# Patient Record
Sex: MALE | Race: WHITE | URBAN - METROPOLITAN AREA
[De-identification: names, ages, dates, MRNs, and addresses within clinical notes are randomized per-mention and may not be internally consistent; named-entity substitution may affect disease eponyms.]

---

## 2023-03-28 NOTE — PATIENT INSTRUCTIONS
- You were seen in clinic for regular annual check-up. We have ordered labs for you and we will call you with the results. Please obtain the bloodwork fasting for 12 hours. OK to drink water the day of your blood draw  -We did focus on painful urination with foamy urine. We will obtain blood test to monitor your kidney function and protein levels. We should also check urine tests to rule out infection or any other inflammatory cause though there is a low suspicion for any secondary underlying cause. In most cases, protein in the urine can cause foamy urine. You do have a high protein intake which is good. Lets see if we can try to incorporate more fruits, vegetables. Try to increase your water intake throughout the daytime as relative dehydration can also cause more concentrated and foamy urine.    -Continue with Xyzal, Zyrtec on an as-needed basis. -Vaccines may be due for: Flu shot, COVID dose #4, tetanus/Tdap  - Please continue to eat a varied diet including recommended servings of vegetables, fruits, and low fat dairy. Minimize high saturated fats (such as fast foods) and high sugar intake (such as soda)  - We recommend 150 minutes of moderate intensity exercise (brisk walking, swimming) weekly to maintain your current weight. Targeted weight loss will require more vigorous exercise or more than 150 minutes/week.       Return to clinic in 1 year for annual physical examination

## 2023-03-30 ENCOUNTER — OFFICE VISIT (OUTPATIENT)
Dept: INTERNAL MEDICINE CLINIC | Facility: CLINIC | Age: 28
End: 2023-03-30

## 2023-03-30 VITALS
OXYGEN SATURATION: 98 % | DIASTOLIC BLOOD PRESSURE: 88 MMHG | HEIGHT: 72 IN | BODY MASS INDEX: 26.41 KG/M2 | SYSTOLIC BLOOD PRESSURE: 134 MMHG | TEMPERATURE: 98 F | WEIGHT: 195 LBS | RESPIRATION RATE: 16 BRPM | HEART RATE: 77 BPM

## 2023-03-30 DIAGNOSIS — Z13.1 SCREENING FOR DIABETES MELLITUS: ICD-10-CM

## 2023-03-30 DIAGNOSIS — Z13.0 SCREENING FOR DEFICIENCY ANEMIA: ICD-10-CM

## 2023-03-30 DIAGNOSIS — Z13.220 SCREENING FOR LIPOID DISORDERS: ICD-10-CM

## 2023-03-30 DIAGNOSIS — Z13.29 SCREENING FOR THYROID DISORDER: ICD-10-CM

## 2023-03-30 DIAGNOSIS — R82.998 FOAMY URINE: ICD-10-CM

## 2023-03-30 DIAGNOSIS — R30.0 DYSURIA: ICD-10-CM

## 2023-03-30 DIAGNOSIS — Z00.00 ANNUAL PHYSICAL EXAM: Primary | ICD-10-CM

## 2023-03-30 PROCEDURE — 3075F SYST BP GE 130 - 139MM HG: CPT | Performed by: INTERNAL MEDICINE

## 2023-03-30 PROCEDURE — 3079F DIAST BP 80-89 MM HG: CPT | Performed by: INTERNAL MEDICINE

## 2023-03-30 PROCEDURE — 3008F BODY MASS INDEX DOCD: CPT | Performed by: INTERNAL MEDICINE

## 2023-03-30 PROCEDURE — 99385 PREV VISIT NEW AGE 18-39: CPT | Performed by: INTERNAL MEDICINE

## 2023-07-03 ENCOUNTER — TELEPHONE (OUTPATIENT)
Dept: INTERNAL MEDICINE CLINIC | Facility: CLINIC | Age: 28
End: 2023-07-03

## 2023-07-03 NOTE — TELEPHONE ENCOUNTER
Patient called, he was sick with food poisoning this morning, missed work  Requesting a note from Dr Oscar Carr for employer  Tasked to nursing

## 2023-07-03 NOTE — TELEPHONE ENCOUNTER
Called and spoke to patient. He is missing work just today 7/3 and returning to work on Wednesday 7/5. Sent patient a [employer] link to sign up for [employer] so he will be able to view note. To Dr. Gisella Casanova-- note pended. Family/Self

## 2023-07-03 NOTE — TELEPHONE ENCOUNTER
Letter looks good. MyChart still not available for patient. I have a printed out copy if he is able to pick it up from the ?

## 2023-07-03 NOTE — TELEPHONE ENCOUNTER
As long as feeling better and able to return to work, we can provide a letter.   Please ask the patient for dates on first missed absence, and next scheduled workday

## 2023-07-24 ENCOUNTER — TELEPHONE (OUTPATIENT)
Dept: INTERNAL MEDICINE CLINIC | Facility: CLINIC | Age: 28
End: 2023-07-24

## 2023-07-24 NOTE — TELEPHONE ENCOUNTER
Patient scheduled mychart appt for 8/25/23 for the following:    \"Constantly having feelings of being congested and having sinus pressure\"    Ok for patient to be seen in the office? [id number] [address] for patient to wait to be seen?     Tasked to nursing

## 2023-08-21 NOTE — TELEPHONE ENCOUNTER
Left message to call back. Please advise - to DR. MELTON - patient called 7/24 - never called back - he scheduled for 8/25/23  for congestion and sinus pressure

## 2023-08-25 ENCOUNTER — OFFICE VISIT (OUTPATIENT)
Dept: INTERNAL MEDICINE CLINIC | Facility: CLINIC | Age: 28
End: 2023-08-25

## 2023-08-25 VITALS
TEMPERATURE: 99 F | OXYGEN SATURATION: 97 % | BODY MASS INDEX: 26.95 KG/M2 | DIASTOLIC BLOOD PRESSURE: 80 MMHG | HEART RATE: 60 BPM | HEIGHT: 72 IN | SYSTOLIC BLOOD PRESSURE: 122 MMHG | WEIGHT: 199 LBS

## 2023-08-25 DIAGNOSIS — J31.0 CHRONIC RHINOSINUSITIS: Primary | ICD-10-CM

## 2023-08-25 DIAGNOSIS — J32.9 CHRONIC RHINOSINUSITIS: Primary | ICD-10-CM

## 2023-08-25 PROCEDURE — 3079F DIAST BP 80-89 MM HG: CPT | Performed by: INTERNAL MEDICINE

## 2023-08-25 PROCEDURE — 3074F SYST BP LT 130 MM HG: CPT | Performed by: INTERNAL MEDICINE

## 2023-08-25 PROCEDURE — 3008F BODY MASS INDEX DOCD: CPT | Performed by: INTERNAL MEDICINE

## 2023-08-25 PROCEDURE — 99213 OFFICE O/P EST LOW 20 MIN: CPT | Performed by: INTERNAL MEDICINE

## 2023-08-25 RX ORDER — AMOXICILLIN AND CLAVULANATE POTASSIUM 875; 125 MG/1; MG/1
1 TABLET, FILM COATED ORAL 2 TIMES DAILY
Qty: 20 TABLET | Refills: 0 | Status: SHIPPED | OUTPATIENT
Start: 2023-08-25 | End: 2023-09-04

## 2023-08-25 RX ORDER — MONTELUKAST SODIUM 10 MG/1
10 TABLET ORAL DAILY
Qty: 90 TABLET | Refills: 3 | Status: SHIPPED | OUTPATIENT
Start: 2023-08-25 | End: 2024-08-19

## 2023-08-25 NOTE — PATIENT INSTRUCTIONS
You were seen in clinic today for evaluation of chronic nasal congestion and sinus congestion. This may be due to allergic rhinitis/hayfever due to environmental allergens. We should first start with conservative measures:  - Due to the prolonged symptoms, lets proceed with antibiotics for sinus infection involving bacterial  - Trial of flonase 1 spray each nostril until symptoms improve. Use if you have predominant symptoms of nasal congestion, runny nose  - Use Zyrtec/cetirizine 10 mg once a day consistently  -Use decongestant such as Sudafed sparingly to avoid rebound congestion.  - We will add Singulair 10 mg once a day to reduce symptoms moving forward. - If still no improvement, we may need to consider evaluation with a specialist in allergy/immunology    Return to clinic on an as-needed basis.

## 2023-09-12 ENCOUNTER — PATIENT MESSAGE (OUTPATIENT)
Dept: INTERNAL MEDICINE CLINIC | Facility: CLINIC | Age: 28
End: 2023-09-12

## 2023-09-12 DIAGNOSIS — J32.9 CHRONIC SINUSITIS, UNSPECIFIED LOCATION: Primary | ICD-10-CM

## 2023-09-13 RX ORDER — METHYLPREDNISOLONE 4 MG/1
TABLET ORAL
Qty: 1 EACH | Refills: 0 | Status: SHIPPED | OUTPATIENT
Start: 2023-09-13

## 2024-02-02 ENCOUNTER — LAB ENCOUNTER (OUTPATIENT)
Dept: LAB | Age: 29
End: 2024-02-02
Attending: INTERNAL MEDICINE
Payer: COMMERCIAL

## 2024-02-02 LAB
ALBUMIN SERPL-MCNC: 4.5 G/DL (ref 3.2–4.8)
ALBUMIN/GLOB SERPL: 1.7 {RATIO} (ref 1–2)
ALP LIVER SERPL-CCNC: 50 U/L
ALT SERPL-CCNC: 36 U/L
ANION GAP SERPL CALC-SCNC: 7 MMOL/L (ref 0–18)
AST SERPL-CCNC: 27 U/L (ref ?–34)
BASOPHILS # BLD AUTO: 0.05 X10(3) UL (ref 0–0.2)
BASOPHILS NFR BLD AUTO: 0.9 %
BILIRUB SERPL-MCNC: 1 MG/DL (ref 0.3–1.2)
BILIRUB UR QL: NEGATIVE
BUN BLD-MCNC: 14 MG/DL (ref 9–23)
BUN/CREAT SERPL: 13.9 (ref 10–20)
CALCIUM BLD-MCNC: 9.1 MG/DL (ref 8.7–10.4)
CHLORIDE SERPL-SCNC: 107 MMOL/L (ref 98–112)
CHOLEST SERPL-MCNC: 191 MG/DL (ref ?–200)
CLARITY UR: CLEAR
CO2 SERPL-SCNC: 28 MMOL/L (ref 21–32)
COLOR UR: COLORLESS
CREAT BLD-MCNC: 1.01 MG/DL
CREAT UR-SCNC: 43 MG/DL
CREAT UR-SCNC: 43 MG/DL
DEPRECATED RDW RBC AUTO: 40.8 FL (ref 35.1–46.3)
EGFRCR SERPLBLD CKD-EPI 2021: 104 ML/MIN/1.73M2 (ref 60–?)
EOSINOPHIL # BLD AUTO: 0.16 X10(3) UL (ref 0–0.7)
EOSINOPHIL NFR BLD AUTO: 2.8 %
ERYTHROCYTE [DISTWIDTH] IN BLOOD BY AUTOMATED COUNT: 12.4 % (ref 11–15)
FASTING PATIENT LIPID ANSWER: NO
FASTING STATUS PATIENT QL REPORTED: NO
GLOBULIN PLAS-MCNC: 2.7 G/DL (ref 2.8–4.4)
GLUCOSE BLD-MCNC: 86 MG/DL (ref 70–99)
GLUCOSE UR-MCNC: NORMAL MG/DL
HCT VFR BLD AUTO: 43.7 %
HDLC SERPL-MCNC: 45 MG/DL (ref 40–59)
HGB BLD-MCNC: 14.1 G/DL
HGB UR QL STRIP.AUTO: NEGATIVE
IMM GRANULOCYTES # BLD AUTO: 0.01 X10(3) UL (ref 0–1)
IMM GRANULOCYTES NFR BLD: 0.2 %
KETONES UR-MCNC: NEGATIVE MG/DL
LDLC SERPL CALC-MCNC: 133 MG/DL (ref ?–100)
LEUKOCYTE ESTERASE UR QL STRIP.AUTO: NEGATIVE
LYMPHOCYTES # BLD AUTO: 2.15 X10(3) UL (ref 1–4)
LYMPHOCYTES NFR BLD AUTO: 37.7 %
MCH RBC QN AUTO: 29 PG (ref 26–34)
MCHC RBC AUTO-ENTMCNC: 32.3 G/DL (ref 31–37)
MCV RBC AUTO: 89.9 FL
MICROALBUMIN UR-MCNC: <0.3 MG/DL
MONOCYTES # BLD AUTO: 0.43 X10(3) UL (ref 0.1–1)
MONOCYTES NFR BLD AUTO: 7.5 %
NEUTROPHILS # BLD AUTO: 2.91 X10 (3) UL (ref 1.5–7.7)
NEUTROPHILS # BLD AUTO: 2.91 X10(3) UL (ref 1.5–7.7)
NEUTROPHILS NFR BLD AUTO: 50.9 %
NITRITE UR QL STRIP.AUTO: NEGATIVE
NONHDLC SERPL-MCNC: 146 MG/DL (ref ?–130)
OSMOLALITY SERPL CALC.SUM OF ELEC: 294 MOSM/KG (ref 275–295)
PH UR: 6 [PH] (ref 5–8)
PLATELET # BLD AUTO: 285 10(3)UL (ref 150–450)
POTASSIUM SERPL-SCNC: 3.9 MMOL/L (ref 3.5–5.1)
PROT SERPL-MCNC: 7.2 G/DL (ref 5.7–8.2)
PROT UR-MCNC: <6 MG/DL (ref ?–14)
PROT UR-MCNC: NEGATIVE MG/DL
RBC # BLD AUTO: 4.86 X10(6)UL
SODIUM SERPL-SCNC: 142 MMOL/L (ref 136–145)
SP GR UR STRIP: 1.01 (ref 1–1.03)
TRIGL SERPL-MCNC: 71 MG/DL (ref 30–149)
UROBILINOGEN UR STRIP-ACNC: NORMAL
VLDLC SERPL CALC-MCNC: 13 MG/DL (ref 0–30)
WBC # BLD AUTO: 5.7 X10(3) UL (ref 4–11)

## 2024-02-02 PROCEDURE — 80053 COMPREHEN METABOLIC PANEL: CPT | Performed by: INTERNAL MEDICINE

## 2024-02-02 PROCEDURE — 82570 ASSAY OF URINE CREATININE: CPT | Performed by: INTERNAL MEDICINE

## 2024-02-02 PROCEDURE — 84156 ASSAY OF PROTEIN URINE: CPT | Performed by: INTERNAL MEDICINE

## 2024-02-02 PROCEDURE — 82043 UR ALBUMIN QUANTITATIVE: CPT | Performed by: INTERNAL MEDICINE

## 2024-02-02 PROCEDURE — 36415 COLL VENOUS BLD VENIPUNCTURE: CPT | Performed by: INTERNAL MEDICINE

## 2024-02-02 PROCEDURE — 80061 LIPID PANEL: CPT | Performed by: INTERNAL MEDICINE

## 2024-02-02 PROCEDURE — 85025 COMPLETE CBC W/AUTO DIFF WBC: CPT | Performed by: INTERNAL MEDICINE

## 2024-02-02 PROCEDURE — 81003 URINALYSIS AUTO W/O SCOPE: CPT | Performed by: INTERNAL MEDICINE

## 2024-02-05 ENCOUNTER — PATIENT MESSAGE (OUTPATIENT)
Dept: INTERNAL MEDICINE CLINIC | Facility: CLINIC | Age: 29
End: 2024-02-05

## 2024-02-05 NOTE — TELEPHONE ENCOUNTER
From: Matt Fish  To: Kristopher Dumont  Sent: 2/5/2024 11:42 AM CST  Subject: Test results    Hello Dr. Dumont,  I wanted to ask if there was anything concerning with my lab results or if there is anything you? I think i saw my cholesterol is a little high right now.     Matt

## 2024-04-10 ENCOUNTER — PATIENT MESSAGE (OUTPATIENT)
Dept: INTERNAL MEDICINE CLINIC | Facility: CLINIC | Age: 29
End: 2024-04-10

## 2024-04-10 ENCOUNTER — TELEPHONE (OUTPATIENT)
Dept: INTERNAL MEDICINE CLINIC | Facility: CLINIC | Age: 29
End: 2024-04-10

## 2024-04-10 NOTE — TELEPHONE ENCOUNTER
----- Message from Matt Fish sent at 4/10/2024 11:21 AM CDT -----  Regarding: [facility]  Contact: [phone]  Dr. Dumont,  I wanted to reach out because I've been dealing with a cold or sinus infection since about Sunday and wanted to see if I could get a prescription? I know I see you on Friday but I wanted to see if i could get ahead of it.     Matt

## 2024-04-10 NOTE — TELEPHONE ENCOUNTER
Has office visit on Friday-and agree that he needs to be seen    In the meantime-  Pt should use flonase and zyrtec daily to help with sinus congestion. Can try sudafed for 2-3 days to see if this helps.   Can try steam 3-4 times daily  Would sleep propped up on a few pillows  Can use cepacol lozenges, honey, warm tea to help with sore throat and cough  Can take tylenol for fever/body aches

## 2024-04-10 NOTE — TELEPHONE ENCOUNTER
Respiratory infection triage:    Fever:  [x]  No fever :100 yesterday  []  Fever>100.4    Cough:  [] Tight cough  [] Cough with exertion  [] Dry cough  [x] Sputum production, Color: yellow    Breathing:  [] Mild shortness of breath interfering with activity  [] Wheezing  [] Pain with deep breathing  [] Using inhaler    Other symptoms:  [x] Sore throat:slightly  [] Difficulty swallowing  [] Nasal drainage/congestion  [x] Sinus congestion/pressure  [x] Ear pain:left ear  [] Body aches  [x] Poor appetite  [] Loss of sense of smell   [] Loss of sense of taste  []Conjunctivitis?    [] Any recent travel?  [] Any sick contacts?  [] Are you a healthcare worker?        ADDITIONAL NOTES:  Please advise - called patient who started with SX on Sunday - he will do home covid test and call back           Notified patient that we will route this message to the doctor and see what their recommendations would be. In the meantime, if anything worsens, they were advised to call back or seek emergent evaluation.

## 2024-04-10 NOTE — TELEPHONE ENCOUNTER
From: Matt Fish  To: Kristopher Dumont  Sent: 4/10/2024 11:21 AM CDT  Subject: [facility]    Dr. Dumont,  I wanted to reach out because I've been dealing with a cold or sinus infection since about Sunday and wanted to see if I could get a prescription? I know I see you on Friday but I wanted to see if i could get ahead of it.     Matt

## 2024-04-11 NOTE — H&P
Matt Lockwood is a 28 year old male.    HPI:     Chief Complaint   Patient presents with    Physical    Sore Throat    Nasal Congestion    Cough     Productive - \"a little yellowish\"      Mr. LOCKWOOD is a 28 year old male coming in for annual physical examination    Sore throat, coughing and pain in the L ear. Sunday morning. May have come on Saturday. Congestion, coughing a lot and L ear pain that day and moved into the Monday. Sore throat progressed during the week. Coughing up some mucus - yellow-nicol clear.     Fever 100 F Weds. Was dayquil and nyquil.     No sick contacts. Somewhat similar to prior sinus infection.     No improvement with Singulair.     +some diarrhea, nausea. It is better.     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   - Sexual Activity: not at the moment  - Hobbies: seeing friends, photography, reading, video games   - Nutrition: Cut out dairy and red meat, chicken salmon, shrimp. More veggies and rice. Fruit smoothies. Drinking plenty of water.   - Physical Activity: trying to get back into exercise, not as much in the last year. Running and lifting.     HISTORY:  Past Medical History:    Allergic rhinitis      Past Surgical History:   Procedure Laterality Date    Skin excision Right     Age 1-2 benign growth    [place] teeth removed Bilateral       Family History   Problem Relation Age of Onset    Hypertension Father     Asthma Mother     Cancer Maternal Grandmother         Lung Cancer    Stroke Paternal Grandfather         2-3 strokes      Social History:   Social History     Socioeconomic History    Marital status: Single   Tobacco Use    Smoking status: Never     Passive exposure: Never    Smokeless tobacco: Never   Vaping Use    Vaping status: Never Used   Substance and Sexual Activity    Alcohol use: Yes     Comment: Socially  on weekends Beer    Drug use: Never        Medications (Active prior to today's visit):  Current Outpatient Medications   Medication Sig Dispense Refill    amoxicillin clavulanate 875-125 MG Oral Tab Take 1 tablet by mouth 2 (two) times daily for 10 days. 20 tablet 0    montelukast (SINGULAIR) 10 MG Oral Tab Take 1 tablet (10 mg total) by mouth daily. (Patient not taking: Reported on 4/12/2024) 90 tablet 3       Allergies:  Allergies   Allergen Reactions    Dander SWELLING     Eyes swell and itching         ROS:   Positive Findings indicated in BOLD    Constitutional: Fever, Chills, Weight Gain, Weight Loss, Night Sweats, Fatigue, Malaise  ENT/Mouth:  Hearing Changes, Ear Pain, Nasal Congestion, Sinus Pain, Hoarseness, Sore throat, Rhinorrhea, Swallowing Difficulty  Eyes: Eye Pain, Swelling, Redness, Foreign Body, Discharge, Vision Changes  Cardiovascular: Chest Pain, SOB, PND, Dyspnea on Exertion, Orthopnea, Claudication, Edema, Palpitations  Respiratory: Cough, Sputum, Wheezing, Shortness of breath  Gastrointestinal: Nausea, Vomiting, Diarrhea, Constipation, Pain, Heartburn, Dysphagia, Bloody stools, Tarry stools  Genitourinary: Dysmenorrhea, Dysuria, Urinary Frequency, Hematuria, Urinary Incontinence, Urgency,  Flank Pain,   Musculoskeletal: Arthralgias, Myalgias, Joint Swelling, Joint Stiffness, Back Pain, Neck Pain  Integumentary: Skin Lesions, Pruritis, Hair Changes, Jaundice, Nail changes  Neuro: Weakness, Numbness, Paresthesias, Loss of Consciousness, Syncope, Dizziness, Headache, Falls  Psych: Anxiety, Depression, Insomnia, Suicidal Ideation, Homicidal ideation, Memory Changes  Heme/Lymph: Bruising, Bleeding, Lymphadenopathy  Endocrine: Polyuria, Polydipsia, Temperature Intolerance    PHYSICAL EXAM:   Vital Signs:  Blood pressure 124/86, pulse 59, temperature 98.4 °F (36.9 °C), temperature source Oral, height 6' (1.829 m), weight 212 lb 12.8 oz (96.5 kg), SpO2 99%.     Constitutional: No acute distress.  Alert and oriented x 3.  Eyes: EOMI, PERRLA, clear sclera b/l  HENT: NCAT, Moist mucous membranes, Oropharynx without erythema or exudates  Neck: No JVD, no thyromegaly  Cardiovascular: S1, S2, no S3, no S4, Regular rate and rhythm, No murmurs/gallops/rubs.   Vascular: Equal pulses 2+ carotids no bruits or thrills/radial/DP/PT bilaterally  Respiratory: Clear to auscultation bilaterally.  No wheezes/rales/rhonchi  Gastrointestinal: Soft, nontender, nondistended. Positive bowel sounds x 4. No rebound tenderness. No hepatomegaly, No splenomegaly  Genitourinary: No CVA tenderness bilaterally  Neurologic: No focal neurological deficits, CN II-XII intact, light touch intact, MSK Strength 5/5 and symmetric in all extremities, normal gait, 2+ patellar tendon  Musculoskeletal: Full range of motion of all extremities, no clubbing/swelling/edema  Skin: No lesions, No erythema, no jaundice, Cap Refill < 2s  Psychiatric: Appropriate mood and affect  Heme/Lymph/Immune: No cervical LAD      DATA REVIEWED   Labs:  Recent Results (from the past 8760 hour(s))   CBC W/ DIFFERENTIAL    Collection Time: 02/02/24  1:51 PM   Result Value Ref Range    WBC 5.7 4.0 - 11.0 x10(3) uL    RBC 4.86 4.30 - 5.70 x10(6)uL    HGB 14.1 13.0 - 17.5 g/dL    HCT 43.7 39.0 - 53.0 %    MCV 89.9 80.0 - 100.0 fL    MCH 29.0 26.0 - 34.0 pg    MCHC 32.3 31.0 - 37.0 g/dL    RDW-SD 40.8 35.1 - 46.3 fL    RDW 12.4 11.0 - 15.0 %    .0 150.0 - 450.0 10(3)uL    Neutrophil Absolute Prelim 2.91 1.50 - 7.70 x10 (3) uL    Neutrophil Absolute 2.91 1.50 - 7.70 x10(3) uL    Lymphocyte Absolute 2.15 1.00 - 4.00 x10(3) uL    Monocyte Absolute 0.43 0.10 - 1.00 x10(3) uL    Eosinophil Absolute 0.16 0.00 - 0.70 x10(3) uL    Basophil Absolute 0.05 0.00 - 0.20 x10(3) uL    Immature Granulocyte Absolute 0.01 0.00 - 1.00 x10(3) uL    Neutrophil % 50.9 %    Lymphocyte % 37.7 %    Monocyte % 7.5 %    Eosinophil % 2.8 %    Basophil % 0.9 %    Immature Granulocyte % 0.2 %      *Note: Due to a large number of results and/or encounters for the requested time period, some results have not been displayed. A complete set of results can be found in Results Review.       Recent Results (from the past 8760 hour(s))   Comp Metabolic Panel (14)    Collection Time: 02/02/24  1:51 PM   Result Value Ref Range    Glucose 86 70 - 99 mg/dL    Sodium 142 136 - 145 mmol/L    Potassium 3.9 3.5 - 5.1 mmol/L    Chloride 107 98 - 112 mmol/L    CO2 28.0 21.0 - 32.0 mmol/L    Anion Gap 7 0 - 18 mmol/L    BUN 14 9 - 23 mg/dL    Creatinine 1.01 0.70 - 1.30 mg/dL    BUN/CREA Ratio 13.9 10.0 - 20.0    Calcium, Total 9.1 8.7 - 10.4 mg/dL    Calculated Osmolality 294 275 - 295 mOsm/kg    eGFR-Cr 104 >=60 mL/min/1.73m2    ALT 36 10 - 49 U/L    AST 27 <=34 U/L    Alkaline Phosphatase 50 45 - 117 U/L    Bilirubin, Total 1.0 0.3 - 1.2 mg/dL    Total Protein 7.2 5.7 - 8.2 g/dL    Albumin 4.5 3.2 - 4.8 g/dL    Globulin  2.7 (L) 2.8 - 4.4 g/dL    A/G Ratio 1.7 1.0 - 2.0    Patient Fasting for CMP? No      *Note: Due to a large number of results and/or encounters for the requested time period, some results have not been displayed. A complete set of results can be found in Results Review.       Cholesterol, Total (mg/dL)   Date Value   02/02/2024 191     HDL Cholesterol (mg/dL)   Date Value   02/02/2024 45     LDL Cholesterol (mg/dL)   Date Value   02/02/2024 133 (H)     Triglycerides (mg/dL)   Date Value   02/02/2024 71       Last A1c value was  % done  .            ASSESSMENT/PLAN:       Chronic Rhinitis  Seasonal allergies  Chronic, recurrent issue.  Exam largely unremarkable  - Rapid strep test negative, will send for strep culture.  - Trial of flonase 1 spray each nostril until symptoms improve. Use if you have predominant symptoms of nasal congestion, runny nose  -Antihistamine such as Zyrtec/Allegra/Claritin once a day  -On last visit, added Singulair 10 mg once a day with minimal relief  -Augmentin 1 tablet twice a  day for 10 days  - We may need to consider evaluation with a specialist in allergy/immunology - Dr. Sosa    Hyperlipidemia  - Borderline elevated with   - Will continue optimizing nutrition, maintaining healthy weight  - We will check repeat cholesterol level in about 3 months         Orders This Visit:  Orders Placed This Encounter   Procedures    Lipid Panel    POC Rapid Strep [64202]    Grp A Strep Cult, Throat [E]       Meds This Visit:  Requested Prescriptions     Signed Prescriptions Disp Refills    amoxicillin clavulanate 875-125 MG Oral Tab 20 tablet 0     Sig: Take 1 tablet by mouth 2 (two) times daily for 10 days.       Imaging & Referrals:  ALLERGY - INTERNAL       Health Maintenance  HTN Screen: BP at goal  DM Screen: Check fasting sugar  HLD Screen: Check fasting lipid panel  HCV Screen: Considered low risk  HIV Screen: considered low risk  G/C/Syphilis: Considered low risk    Colon Cancer Screening (45-70): Not indicated  Prostate Cancer Screening: (50-70): Not indicated  Lung Cancer Screening (55-79 with 30 p/year and active < 15 years): Not indicated  AAA Screening (65-75 Hx of smoking): Not indicated    Influenza: Annually   Td/Tdap: Last Tdap 2012,   Zoster (50+): Not indicated  HPV (19-26): Not indicated  Pneumococcal: Not indicated    Immunization History   Administered Date(s) Administered    >=3 YRS FLUZONE TRI PRESERV FREE SINGLE DOSE (46450) FLU CLINIC 10/12/2020    Covid-19 Vaccine Moderna 100 mcg/0.5 ml 03/19/2021    Covid-19 Vaccine Pfizer 30 mcg/0.3 ml 12/10/2021, 12/31/2021    DTAP INFANRIX 07/27/1999    DTP 08/02/1995, 10/04/1995, 12/06/1995, 06/07/1996    Flucelvax 0.5ml Vaccine 10/01/2019    HEP B, Ped/Adol 08/02/1995, 10/04/1995, 12/05/1995    HIB 08/02/1995, 10/04/1995, 12/06/1995, 06/07/1996    Hib, Unspecified Formulation 08/02/1995, 10/04/1995, 12/06/1995, 06/07/1996    IPV 07/27/1999    Influenza 10/01/2019    MMR 06/07/1996, 07/27/1999    Meningococcal-Menactra  06/25/2013    OPV 08/02/1995, 10/04/1995, 12/05/1995    RHO(D) Immune Globulin 07/27/1999    TDAP 06/08/2009, 06/19/2009, 02/10/2012, 08/15/2019    Tb Intradermal Test 07/27/1999, 08/01/2018, 02/05/2020         Return to clinic in 1 year for annual physical examination    Kristopher Dumont MD, 04/12/24, 9:55 PM

## 2024-04-11 NOTE — PATIENT INSTRUCTIONS
- You were seen in clinic for regular annual check-up.  We did review your most recent set of blood work with a slight increase your cholesterol levels.    We recommend optimizing nutrition, maintaining healthy weight with good exercise.  Plan to repeat cholesterol level in about 3 months    -May be experiencing another sinus infection for which we recommended:     Trial of flonase 1 spray each nostril until symptoms improve. Use if you have predominant symptoms of nasal congestion, runny nose  -Continue with antihistamine such as Zyrtec/Claritin/Allegra/Xyzal once a day.  -Use decongestant such as Sudafed sparingly to avoid rebound congestion.  - We will add Singulair 10 mg once a day to reduce symptoms moving forward.  -Augmentin 1 tablet twice a day for 10 days  - We may need to consider evaluation with a specialist in allergy/immunology - Dr. Sosa.  He may recommend more advanced management for long-term relief of the sinuses    We will follow-up on the strep culture that we sent for today.  Regardless, the Augmentin would cover for potential strep infection if this came back positive.    -May be due for COVID dose #4  - Please continue to eat a varied diet including recommended servings of vegetables, fruits, and low fat dairy. Minimize high saturated fats (such as fast foods) and high sugar intake (such as soda)  - We recommend 150 minutes of moderate intensity exercise (brisk walking, swimming) weekly to maintain your current weight.  Targeted weight loss will require more vigorous exercise or more than 150 minutes/week.    Return to clinic in 1 year for annual physical examination

## 2024-04-12 ENCOUNTER — OFFICE VISIT (OUTPATIENT)
Dept: INTERNAL MEDICINE CLINIC | Facility: CLINIC | Age: 29
End: 2024-04-12

## 2024-04-12 VITALS
TEMPERATURE: 98 F | HEART RATE: 59 BPM | WEIGHT: 212.81 LBS | OXYGEN SATURATION: 99 % | BODY MASS INDEX: 28.82 KG/M2 | HEIGHT: 72 IN | DIASTOLIC BLOOD PRESSURE: 86 MMHG | SYSTOLIC BLOOD PRESSURE: 124 MMHG

## 2024-04-12 DIAGNOSIS — J32.9 CHRONIC RHINOSINUSITIS: ICD-10-CM

## 2024-04-12 DIAGNOSIS — J02.9 SORE THROAT: ICD-10-CM

## 2024-04-12 DIAGNOSIS — Z13.0 SCREENING FOR DEFICIENCY ANEMIA: ICD-10-CM

## 2024-04-12 DIAGNOSIS — E78.5 BORDERLINE HYPERLIPIDEMIA: ICD-10-CM

## 2024-04-12 DIAGNOSIS — Z13.1 SCREENING FOR DIABETES MELLITUS: ICD-10-CM

## 2024-04-12 DIAGNOSIS — Z00.00 ANNUAL PHYSICAL EXAM: Primary | ICD-10-CM

## 2024-04-12 LAB
CONTROL LINE PRESENT WITH A CLEAR BACKGROUND (YES/NO): YES YES/NO
KIT LOT #: NORMAL NUMERIC
STREP GRP A CUL-SCR: NEGATIVE

## 2024-04-12 PROCEDURE — 87081 CULTURE SCREEN ONLY: CPT | Performed by: INTERNAL MEDICINE

## 2024-04-12 RX ORDER — AMOXICILLIN AND CLAVULANATE POTASSIUM 875; 125 MG/1; MG/1
1 TABLET, FILM COATED ORAL 2 TIMES DAILY
Qty: 20 TABLET | Refills: 0 | Status: SHIPPED | OUTPATIENT
Start: 2024-04-12 | End: 2024-04-22

## 2024-04-15 ENCOUNTER — TELEPHONE (OUTPATIENT)
Dept: INTERNAL MEDICINE CLINIC | Facility: CLINIC | Age: 29
End: 2024-04-15

## 2024-04-15 NOTE — TELEPHONE ENCOUNTER
Please notify the patient that his strep culture came back negative.  This rules out strep throat.  However we should continue the antibiotics for earinfection versus sinus infection.

## 2024-04-22 ENCOUNTER — PATIENT MESSAGE (OUTPATIENT)
Dept: INTERNAL MEDICINE CLINIC | Facility: CLINIC | Age: 29
End: 2024-04-22

## 2024-06-22 ENCOUNTER — PATIENT MESSAGE (OUTPATIENT)
Dept: INTERNAL MEDICINE CLINIC | Facility: CLINIC | Age: 29
End: 2024-06-22

## 2024-06-24 NOTE — TELEPHONE ENCOUNTER
From: Matt Fish  To: Kristopher Dumont  Sent: 6/22/2024 8:44 PM CDT  Subject: Infection     Dr. Dumont,  This is something very odd, but I think I have some sort infection in my belly button. I have a swollen spot and have also had some discharge when I put pressure on it today. The charge was yellowish and was also followed by some blood. What should I do for this?     Matt

## 2024-06-25 ENCOUNTER — HOSPITAL ENCOUNTER (OUTPATIENT)
Age: 29
Discharge: HOME | End: 2024-06-25

## 2024-06-25 VITALS
TEMPERATURE: 98 F | OXYGEN SATURATION: 100 % | DIASTOLIC BLOOD PRESSURE: 89 MMHG | HEART RATE: 70 BPM | RESPIRATION RATE: 19 BRPM | SYSTOLIC BLOOD PRESSURE: 155 MMHG

## 2024-06-25 DIAGNOSIS — L03.316 CELLULITIS OF UMBILICUS: Primary | ICD-10-CM

## 2024-06-25 PROCEDURE — 87070 CULTURE OTHR SPECIMN AEROBIC: CPT | Performed by: NURSE PRACTITIONER

## 2024-06-25 PROCEDURE — 87077 CULTURE AEROBIC IDENTIFY: CPT | Performed by: NURSE PRACTITIONER

## 2024-06-25 PROCEDURE — 87205 SMEAR GRAM STAIN: CPT | Performed by: NURSE PRACTITIONER

## 2024-06-25 PROCEDURE — 99214 OFFICE O/P EST MOD 30 MIN: CPT

## 2024-06-25 PROCEDURE — 87186 SC STD MICRODIL/AGAR DIL: CPT | Performed by: NURSE PRACTITIONER

## 2024-06-25 PROCEDURE — 99204 OFFICE O/P NEW MOD 45 MIN: CPT

## 2024-06-25 RX ORDER — CEPHALEXIN 500 MG/1
500 CAPSULE ORAL 2 TIMES DAILY
Qty: 14 CAPSULE | Refills: 0 | Status: SHIPPED | OUTPATIENT
Start: 2024-06-25 | End: 2024-07-02

## 2024-06-25 RX ORDER — SULFAMETHOXAZOLE AND TRIMETHOPRIM 800; 160 MG/1; MG/1
1 TABLET ORAL 2 TIMES DAILY
Qty: 14 TABLET | Refills: 0 | Status: SHIPPED | OUTPATIENT
Start: 2024-06-25 | End: 2024-07-02

## 2024-06-25 NOTE — ED INITIAL ASSESSMENT (HPI)
Patient states his belly button was hurting a little bit last week  He pressed on it over the weekend and some drainage came out   States it is still red and irritated   No fevers

## 2024-06-25 NOTE — DISCHARGE INSTRUCTIONS
As discussed, we will treat you for skin infection of the umbilicus.  Antibiotics prescribed: Keflex twice a day for 7 days and Bactrim twice a day for 7 days.  We have sent a wound culture, results typically take about 2 to 3 days.  Summary will contact you if any antibiotics need to be changed or stopped.  Please wash your bellybutton carefully.  Make sure you dry the bellybutton as well with a Q-tip.  You may also put a small amount of antibiotic ointment on a Q-tip and around it and bellybutton.  If any worsening symptoms of redness, drainage, discharge, fever, please return to immediate care for further evaluation.

## 2024-06-25 NOTE — ED PROVIDER NOTES
Patient Seen in: [facility]      History     Chief Complaint   Patient presents with    Skin Problem     Stated Complaint: Abdominal Pain - Pain in my naval with swelling, tenderness, and discharge.    Subjective: This is a 29-year-old male, past medical history of allergic rhinitis, presents to immediate care for evaluation of pain and drainage from bellybutkinza.  Patient states he was advised that it was red and irritated last week while playing golf on Tuesday.  He does note very humid temperatures that caused him to sweat excessively.  He notes that over the weekend he was able to around his navel and some pressure with drainage came out.  Not odorous.  No further drainage since Saturday.  He does feel as if his symptoms are slightly improved.  However, does report redness and irritation.  No fever or chills.  No recent swimming.  No piercings in this area.  Well-appearing.  AOx4.  The history is provided by the patient.           Objective:   No pertinent past medical history.            No pertinent past surgical history.              No pertinent social history.            Review of Systems   Constitutional: Negative.  Negative for activity change, appetite change, chills, fatigue and fever.   Gastrointestinal: Negative.  Negative for abdominal pain, diarrhea, nausea and vomiting.   Genitourinary: Negative.    Musculoskeletal: Negative.    Skin:  Positive for color change.   Neurological: Negative.        Positive for stated Chief Complaint: Skin Problem    Other systems are as noted in HPI.  Constitutional and vital signs reviewed.      All other systems reviewed and negative except as noted above.    Physical Exam     ED Triage Vitals [06/25/24 1828]   /89   Pulse 70   Resp 19   Temp 97.5 °F (36.4 °C)   Temp src Temporal   SpO2 100 %   O2 Device None (Room air)       Current Vitals:   Vital Signs  BP: 155/89  Pulse: 70  Resp: 19  Temp: 97.5 °F (36.4 °C)  Temp src: Temporal    Oxygen  Therapy  SpO2: 100 %  O2 Device: None (Room air)            Physical Exam  Constitutional:       General: He is not in acute distress.     Appearance: Normal appearance. He is not ill-appearing or toxic-appearing.   HENT:      Head: Normocephalic.   Cardiovascular:      Rate and Rhythm: Normal rate.      Pulses: Normal pulses.   Pulmonary:      Effort: Pulmonary effort is normal.   Abdominal:      General: Abdomen is flat. There is no distension.      Palpations: Abdomen is soft.      Tenderness: There is no abdominal tenderness. There is no guarding.   Musculoskeletal:         General: Normal range of motion.   Skin:     General: Skin is warm.      Capillary Refill: Capillary refill takes less than 2 seconds.      Findings: Erythema present.          Neurological:      General: No focal deficit present.      Mental Status: He is alert and oriented to person, place, and time.               ED Course     Labs Reviewed   AEROBIC BACTERIAL CULTURE                      MDM      Differentials considered include: Cellulitis of umbilicus, abscess, folliculitis, umbilical hernia.    Abdomen soft and nontender.  No palpable umbilical hernia.    Patient does not shave his chest or abdomen.  No folliculitis appreciated.    No palpable mass, abscess.  No fluctuance or induration.  No drainage upon palpation of umbilical borders.    Will treat patient for suspected cellulitis with antibiotics.  He is aware wound culture sent to lab and results available in 48 to 72 hours.    Patient is aware of umbilical skin care.  He voiced understand agrees with plan of care.  He is aware of signs symptoms that warrant further evaluation.    All questions answered at time of discharge.                                   Medical Decision Making      Disposition and Plan     Clinical Impression:  1. Cellulitis of umbilicus         Disposition:  Discharge  6/25/2024  6:46 pm    Follow-up:  Kristopher Dumont MD  [address] [place] [address]  [id number]  [phone]    In 3 days  If symptoms worsen          Medications Prescribed:  Discharge Medication List as of 6/25/2024  6:53 PM        START taking these medications    Details   cephalexin 500 MG Oral Cap Take 1 capsule (500 mg total) by mouth 2 (two) times daily for 7 days., Normal, Disp-14 capsule, R-0      sulfamethoxazole-trimethoprim -160 MG Oral Tab per tablet Take 1 tablet by mouth 2 (two) times daily for 7 days., Normal, Disp-14 tablet, R-0

## 2024-06-28 ENCOUNTER — PATIENT MESSAGE (OUTPATIENT)
Dept: INTERNAL MEDICINE CLINIC | Facility: CLINIC | Age: 29
End: 2024-06-28

## 2024-06-28 NOTE — TELEPHONE ENCOUNTER
From: Matt Fish  To: Kristopher Dumont  Sent: 6/28/2024 8:23 AM CDT  Subject: Recent test results     Dr. Dumont,  I just had some questions about the test results I got back this morning. The immediate care NP I saw prescribed cephalexin and sulfamethoxazole-trimethoprim for the the current issue and I just wanted to ask if I that’s what I need to keep taking or if I need to be on something else? Please let me know. Thank you.

## 2024-07-10 ENCOUNTER — NURSE ONLY (OUTPATIENT)
Dept: ALLERGY | Facility: CLINIC | Age: 29
End: 2024-07-10

## 2024-07-10 ENCOUNTER — OFFICE VISIT (OUTPATIENT)
Dept: ALLERGY | Facility: CLINIC | Age: 29
End: 2024-07-10
Payer: COMMERCIAL

## 2024-07-10 VITALS
HEIGHT: 73 IN | SYSTOLIC BLOOD PRESSURE: 129 MMHG | OXYGEN SATURATION: 95 % | HEART RATE: 73 BPM | WEIGHT: 210 LBS | BODY MASS INDEX: 27.83 KG/M2 | DIASTOLIC BLOOD PRESSURE: 84 MMHG

## 2024-07-10 DIAGNOSIS — H10.10 SEASONAL AND PERENNIAL ALLERGIC RHINOCONJUNCTIVITIS: Primary | ICD-10-CM

## 2024-07-10 DIAGNOSIS — Z23 NEED FOR COVID-19 VACCINE: ICD-10-CM

## 2024-07-10 DIAGNOSIS — J30.89 ENVIRONMENTAL AND SEASONAL ALLERGIES: Primary | ICD-10-CM

## 2024-07-10 DIAGNOSIS — J32.9 CHRONIC RHINOSINUSITIS: ICD-10-CM

## 2024-07-10 DIAGNOSIS — J30.89 SEASONAL AND PERENNIAL ALLERGIC RHINOCONJUNCTIVITIS: Primary | ICD-10-CM

## 2024-07-10 DIAGNOSIS — J34.3 NASAL TURBINATE HYPERTROPHY: ICD-10-CM

## 2024-07-10 DIAGNOSIS — Z23 FLU VACCINE NEED: ICD-10-CM

## 2024-07-10 DIAGNOSIS — J30.2 SEASONAL AND PERENNIAL ALLERGIC RHINOCONJUNCTIVITIS: Primary | ICD-10-CM

## 2024-07-10 PROCEDURE — 95004 PERQ TESTS W/ALRGNC XTRCS: CPT | Performed by: ALLERGY & IMMUNOLOGY

## 2024-07-10 PROCEDURE — 95024 IQ TESTS W/ALLERGENIC XTRCS: CPT | Performed by: ALLERGY & IMMUNOLOGY

## 2024-07-10 PROCEDURE — 99204 OFFICE O/P NEW MOD 45 MIN: CPT | Performed by: ALLERGY & IMMUNOLOGY

## 2024-07-10 RX ORDER — FLUTICASONE PROPIONATE 50 MCG
2 SPRAY, SUSPENSION (ML) NASAL DAILY
Qty: 3 EACH | Refills: 1 | Status: SHIPPED | OUTPATIENT
Start: 2024-07-10

## 2024-07-10 RX ORDER — LEVOCETIRIZINE DIHYDROCHLORIDE 5 MG/1
5 TABLET, FILM COATED ORAL EVERY EVENING
Qty: 90 TABLET | Refills: 1 | Status: SHIPPED | OUTPATIENT
Start: 2024-07-10

## 2024-07-10 RX ORDER — CETIRIZINE HYDROCHLORIDE 10 MG/1
10 TABLET ORAL DAILY
COMMUNITY
End: 2024-07-10

## 2024-07-10 NOTE — PROGRESS NOTES
Matt Fish is a 29 year old male.    HPI:     Chief Complaint   Patient presents with    Allergies     Patient presents with concerns for allergies- has scratchy throat, itchy puffy eyes, has sinus pressure hard to breath through nose.        Patient is a 29-year-old male who presents for allergy consultation upon referral of his PCP with a chief complaint of allergies.  Prior note from visit with PCP on April 12, 2024 reviewed and appreciated including concern for seasonal allergic triggers.  PCP treated with Augmentin for suspected acute sinusitis.  Current medication list includes Singulair    Vaccines reviewed.  COVID-vaccine x 3 doses last in 2021  No flu vaccine on record for the past year    Today patient reports    Allergies  Duration:   a few years   Timing:   YR + summer   Symptoms: sinus pressure and congestion  nasal congestion(L> R can rotate) , puffy eyes, scratchy throat , int chest tightness,  sz, we   No c wz   No fever or MP   Severity: moderate   Status: worsening   Triggers: Allergies?  Tried: Augmentin, Singulair(no better) , zyrtec (some), flonase   Pets or smokers: none     Hx of asthma, ad, or food allergy:   Denies    PT assistant       HISTORY:  Past Medical History:    Allergic rhinitis      Past Surgical History:   Procedure Laterality Date    Skin excision Right     Age 1-2 benign growth    [place] teeth removed Bilateral       Family History   Problem Relation Age of Onset    Hypertension Father     Asthma Mother     Cancer Maternal Grandmother         Lung Cancer    Stroke Paternal Grandfather         2-3 strokes    Asthma Sister       Social History:   Social History     Socioeconomic History    Marital status: Single   Tobacco Use    Smoking status: Never     Passive exposure: Never    Smokeless tobacco: Never   Vaping Use    Vaping status: Never Used   Substance and Sexual Activity    Alcohol use: Yes     Comment: Socially on weekends Beer    Drug use: Never        Medications  (Active prior to today's visit):  Current Outpatient Medications   Medication Sig Dispense Refill    levocetirizine 5 MG Oral Tab Take 1 tablet (5 mg total) by mouth every evening. 90 tablet 1    fluticasone propionate 50 MCG/ACT Nasal Suspension 2 sprays by Nasal route daily. 3 each 1    montelukast (SINGULAIR) 10 MG Oral Tab Take 1 tablet (10 mg total) by mouth daily. (Patient not taking: Reported on 4/12/2024) 90 tablet 3       Allergies:  Allergies   Allergen Reactions    Cat Hair Extract SWELLING     Eyes swell and itching    Dander SWELLING     Eyes swell and itching         ROS:     Allergic/Immuno:  See HPI  Cardiovascular:  Negative for irregular heartbeat/palpitations, chest pain, edema  Constitutional:  Negative night sweats,weight loss, irritability and lethargy  Endocrine:  Negative for cold intolerance, polydipsia and polyphagia  ENMT:  Negative for ear drainage, hearing loss  see hpi   Eyes:  Negative for eye discharge and vision loss  Gastrointestinal:  Negative for abdominal pain, diarrhea and vomiting  Genitourinary:  Negative for dysuria and hematuria  Hema/Lymph:  Negative for easy bleeding and easy bruising  Integumentary:  Negative for pruritus and rash  Musculoskeletal:  Negative for joint symptoms  Neurological:  Negative for dizziness, seizures  Psychiatric:  Negative for inappropriate interaction and psychiatric symptoms  Respiratory:  Negative for cough, dyspnea and wheezing      PHYSICAL EXAM:   Constitutional: responsive, no acute distress noted  Head/Face: NC/Atraumatic  Eyes/Vision: conjunctiva and lids are normal extraocular motion is intact   Ears/Audiometry: tympanic membranes are normal bilaterally hearing is grossly intact  Nose/Mouth/Throat: nose and throat are clear mucous membranes are moist   Neck/Thyroid: neck is supple without adenopathy  Lymphatic: no abnormal cervical, supraclavicular or axillary adenopathy is noted  Respiratory: normal to inspection lungs are clear to  auscultation bilaterally normal respiratory effort   Cardiovascular: regular rate and rhythm no murmurs, gallups, or rubs  Abdomen: soft non-tender non-distended  Skin/Hair: no unusual rashes present  Extremities: no edema, cyanosis, or clubbing  Neurological:Oriented to time, place, person & situation       ASSESSMENT/PLAN:   Assessment   Encounter Diagnoses   Name Primary?    Seasonal and perennial allergic rhinoconjunctivitis Yes    Flu vaccine need     Need for COVID-19 vaccine     Chronic rhinosinusitis     Nasal turbinate hypertrophy        Skin testing today to common indoor and outdoor environmental allergies was + to rw, weeds  cat, dog     Positive histamine control    #1 allergic rhinitis   Year-round symptoms with worsening in the summertime.  Patient has tried Zyrtec and Flonase intermittently Singulair was not of benefit  Serial skin testing to screen for allergic triggers  Reviewed avoidance measures and potential treatment option immunotherapy  Trial of Xyzal, levocetirizine 5 mg once a day as an antihistamine  Trial of Flonase 2 sprays per nostril once a day.  Best used daily to prevent symptoms and may take 3 to 5 days to take full effect  May add pseudoephedrine 120 mg every 12 hours if still having nasal or sinus congestion.        #2 Flu vaccine recommended in the fall      #3 COVID vaccines reviewed.  Recommend booster.  Reviewed I do not have the booster my office.  Most recent booster available is in September 2023       #4 nasal turbinate hypertrophy  will assess response to medications for allergies.  If not improving may consider ENT evaluation of turbinates  To see if turbinate reduction would be of value as remains with maintenance or certification of some form               Orders This Visit:  No orders of the defined types were placed in this encounter.      Meds This Visit:  Requested Prescriptions     Signed Prescriptions Disp Refills    levocetirizine 5 MG Oral Tab 90 tablet 1      Sig: Take 1 tablet (5 mg total) by mouth every evening.    fluticasone propionate 50 MCG/ACT Nasal Suspension 3 each 1     Si sprays by Nasal route daily.       Imaging & Referrals:  ALLERGY - INTERNAL     7/10/2024  Stewart Sosa MD      If medication samples were provided today, they were provided solely for patient education and training related to self administration of these medications.  Teaching, instruction and sample was provided to the patient by myself.  Teaching included  a review of potential adverse side effects as well as potential efficacy.  Patient's questions were answered in regards to medication administration and dosing and potential side effects. Teaching was provided via the teach back method

## 2024-07-10 NOTE — PATIENT INSTRUCTIONS
#1 allergic rhinitis   Year-round symptoms with worsening in the summertime.  Patient has tried Zyrtec and Flonase intermittently Singulair was not of benefit  Serial skin testing to screen for allergic triggers  Reviewed avoidance measures and potential treatment option immunotherapy  Trial of Xyzal, levocetirizine 5 mg once a day as an antihistamine  Trial of Flonase 2 sprays per nostril once a day.  Best used daily to prevent symptoms and may take 3 to 5 days to take full effect  May add pseudoephedrine 120 mg every 12 hours if still having nasal or sinus congestion.        #2 Flu vaccine recommended in the fall      #3 COVID vaccines reviewed.  Recommend booster.  Reviewed I do not have the booster my office.  Most recent booster available is in September 2023       #4 nasal turbinate hypertrophy  will assess response to medications for allergies.  If not improving may consider ENT evaluation of turbinates  To see if turbinate reduction would be of value

## 2024-07-14 NOTE — PATIENT INSTRUCTIONS
He was seen in clinic today for follow-up.  Today, we did review your visit with Dr. Sosa  - Hopefully we can get better control of your sinus symptoms  - Continue with Xyzal 5 mg once a day  - Flonase 1- 2 sprays until symptoms improve  - Continue with Singulair in the meantime  - Continue following with Dr. Sosa - ENT may be the next step if needed    We have to hear that there has been improvement of your symptoms however    Lets repeat the cholesterol panel fasting within the next 1-2 months  - Lets continue optimizing nutrition, exercising as able    CBC dermatology for evaluation of the subcutaneous cyst, moles on the right temple and left side of the head.  These are likely benign findings, but lets have this evaluated further    Return to clinic on as-needed basis

## 2024-07-14 NOTE — PROGRESS NOTES
Matt Lockwood is a 29 year old male.    HPI:     Chief Complaint   Patient presents with    Checkup     3 month      Mr. LOCKWOOD is a 29 year old male past medical history of allergic rhinitis who presents today for follow-up.    Of note, he had urgent care visit 6/25/2024, [facility] area was red and irritated after playing golf.  He was treated for cellulitis to the area, with antibiotics. This has resolved.    Sinuses are about the usual, stuffed up in the nose. Flonase using frequently. Increased tissue inside the nose - may need to see ENT. 2 sinus infections since this year. R ear, he can feel something inside. Trying the Xyzal. Would get    No changes with using the bathroom. BM daily 1x a day. Trying to eat healthier, keto diet. Trying to cut down on carbs. Red meat, salmon, chicken - cutting back on rice, more veggies. Water and coffee, alcohol on occasion.     Stress and anxiety is well controlled. Work is going well. 6-8 hours which is better, twice a night.     He has some cysts in the L scalp - no new ones, there is some family history with siblings and mom. Some pressure in some of the cysts when he has a sinus infection.  Mole he would like to get check out in the right temporal area.  Has had it for some time, but feels like it is larger    HISTORY:  Past Medical History:    Allergic rhinitis      Past Surgical History:   Procedure Laterality Date    Skin excision Right     Age 1-2 benign growth    [place] teeth removed Bilateral       Family History   Problem Relation Age of Onset    Hypertension Father     Asthma Mother     Cancer Maternal Grandmother         Lung Cancer    Stroke Paternal Grandfather         2-3 strokes    Asthma Sister       Social History:   Social History     Socioeconomic History    Marital status: Single   Tobacco Use    Smoking status: Never     Passive exposure: Never    Smokeless tobacco: Never   Vaping Use    Vaping status: Never Used   Substance and Sexual Activity     Alcohol use: Yes     Comment: Socially on weekends Beer    Drug use: Never        Medications (Active prior to today's visit):  Current Outpatient Medications   Medication Sig Dispense Refill    levocetirizine 5 MG Oral Tab Take 1 tablet (5 mg total) by mouth every evening. 90 tablet 1    fluticasone propionate 50 MCG/ACT Nasal Suspension 2 sprays by Nasal route daily. 3 each 1       Allergies:  Allergies   Allergen Reactions    Cat Hair Extract SWELLING     Eyes swell and itching    Dander SWELLING     Eyes swell and itching         ROS:   Positive Findings indicated in BOLD    Constitutional: Fever, Chills, Weight Gain, Weight Loss, Night Sweats, Fatigue, Malaise  ENT/Mouth:  Hearing Changes, Ear Pain, Nasal Congestion, Sinus Pain, Hoarseness, Sore throat, Rhinorrhea, Swallowing Difficulty  Eyes: Eye Pain, Swelling, Redness, Foreign Body, Discharge, Vision Changes  Cardiovascular: Chest Pain, SOB, PND, Dyspnea on Exertion, Orthopnea, Claudication, Edema, Palpitations  Respiratory: Cough, Sputum, Wheezing, Shortness of breath  Gastrointestinal: Nausea, Vomiting, Diarrhea, Constipation, Pain, Heartburn, Dysphagia, Bloody stools, Tarry stools  Genitourinary: Dysmenorrhea, Dysuria, Urinary Frequency, Hematuria, Urinary Incontinence, Urgency,  Flank Pain,   Musculoskeletal: Arthralgias, Myalgias, Joint Swelling, Joint Stiffness, Back Pain, Neck Pain  Integumentary: Skin Lesions, Pruritis, Hair Changes, Jaundice, Nail changes  Neuro: Weakness, Numbness, Paresthesias, Loss of Consciousness, Syncope, Dizziness, Headache, Falls  Psych: Anxiety, Depression, Insomnia, Suicidal Ideation, Homicidal ideation, Memory Changes  Heme/Lymph: Bruising, Bleeding, Lymphadenopathy  Endocrine: Polyuria, Polydipsia, Temperature Intolerance    PHYSICAL EXAM:   Vital Signs:  Blood pressure 132/84, pulse 90, height 6' 1\" (1.854 m), weight 212 lb 8 oz (96.4 kg), SpO2 98%.     Constitutional: No acute distress. Alert and oriented x 3.  Eyes:  EOMI, PERRLA, clear sclera b/l  HENT: NCAT, Moist mucous membranes, Oropharynx without erythema or exudates  Neck: No JVD, no thyromegaly  Cardiovascular: S1, S2, no S3, no S4, Regular rate and rhythm, No murmurs/gallops/rubs.   Vascular: Equal pulses 2+ carotids no bruits or thrills/radial/DP/PT bilaterally  Respiratory: Clear to auscultation bilaterally.  No wheezes/rales/rhonchi  Gastrointestinal: Soft, nontender, nondistended. Positive bowel sounds x 4. No rebound tenderness. No hepatomegaly, No splenomegaly  Genitourinary: No CVA tenderness bilaterally  Neurologic: No focal neurological deficits, CN II-XII intact, light touch intact, MSK Strength 5/5 and symmetric in all extremities, normal gait, 2+ patellar tendon  Musculoskeletal: Full range of motion of all extremities, no clubbing/swelling/edema  Skin: + 3 palpable likely subcutaneous cyst of the scalp, right temporal area, frontal area, left parietal area  + 2 beige-colored skin toned nevi right temporal, left parietal area  Psychiatric: Appropriate mood and affect  Heme/Lymph/Immune: No cervical LAD      DATA REVIEWED   Labs:  Recent Results (from the past 8760 hour(s))   CBC W/ DIFFERENTIAL    Collection Time: 02/02/24  1:51 PM   Result Value Ref Range    WBC 5.7 4.0 - 11.0 x10(3) uL    RBC 4.86 4.30 - 5.70 x10(6)uL    HGB 14.1 13.0 - 17.5 g/dL    HCT 43.7 39.0 - 53.0 %    MCV 89.9 80.0 - 100.0 fL    MCH 29.0 26.0 - 34.0 pg    MCHC 32.3 31.0 - 37.0 g/dL    RDW-SD 40.8 35.1 - 46.3 fL    RDW 12.4 11.0 - 15.0 %    .0 150.0 - 450.0 10(3)uL    Neutrophil Absolute Prelim 2.91 1.50 - 7.70 x10 (3) uL    Neutrophil Absolute 2.91 1.50 - 7.70 x10(3) uL    Lymphocyte Absolute 2.15 1.00 - 4.00 x10(3) uL    Monocyte Absolute 0.43 0.10 - 1.00 x10(3) uL    Eosinophil Absolute 0.16 0.00 - 0.70 x10(3) uL    Basophil Absolute 0.05 0.00 - 0.20 x10(3) uL    Immature Granulocyte Absolute 0.01 0.00 - 1.00 x10(3) uL    Neutrophil % 50.9 %    Lymphocyte % 37.7 %     Monocyte % 7.5 %    Eosinophil % 2.8 %    Basophil % 0.9 %    Immature Granulocyte % 0.2 %     *Note: Due to a large number of results and/or encounters for the requested time period, some results have not been displayed. A complete set of results can be found in Results Review.       Recent Results (from the past 8760 hour(s))   Comp Metabolic Panel (14)    Collection Time: 02/02/24  1:51 PM   Result Value Ref Range    Glucose 86 70 - 99 mg/dL    Sodium 142 136 - 145 mmol/L    Potassium 3.9 3.5 - 5.1 mmol/L    Chloride 107 98 - 112 mmol/L    CO2 28.0 21.0 - 32.0 mmol/L    Anion Gap 7 0 - 18 mmol/L    BUN 14 9 - 23 mg/dL    Creatinine 1.01 0.70 - 1.30 mg/dL    BUN/CREA Ratio 13.9 10.0 - 20.0    Calcium, Total 9.1 8.7 - 10.4 mg/dL    Calculated Osmolality 294 275 - 295 mOsm/kg    eGFR-Cr 104 >=60 mL/min/1.73m2    ALT 36 10 - 49 U/L    AST 27 <=34 U/L    Alkaline Phosphatase 50 45 - 117 U/L    Bilirubin, Total 1.0 0.3 - 1.2 mg/dL    Total Protein 7.2 5.7 - 8.2 g/dL    Albumin 4.5 3.2 - 4.8 g/dL    Globulin  2.7 (L) 2.8 - 4.4 g/dL    A/G Ratio 1.7 1.0 - 2.0    Patient Fasting for CMP? No      *Note: Due to a large number of results and/or encounters for the requested time period, some results have not been displayed. A complete set of results can be found in Results Review.       Cholesterol, Total (mg/dL)   Date Value   02/02/2024 191     HDL Cholesterol (mg/dL)   Date Value   02/02/2024 45     LDL Cholesterol (mg/dL)   Date Value   02/02/2024 133 (H)     Triglycerides (mg/dL)   Date Value   02/02/2024 71       Last A1c value was  % done  .            ASSESSMENT/PLAN:       Chronic Rhinitis  Seasonal allergies  Chronic, recurrent issue.  Exam largely unremarkable  - Rapid strep test negative, will send for strep culture.  - Trial of flonase 1 spray each nostril until symptoms improve. Use if you have predominant symptoms of nasal congestion, runny nose  -On last visit, added Singulair 10 mg once a day with minimal  relief  -Established with Dr. Sosa of allergy clinic, trial of Xyzal 5 mg once a day, Flonase 2 sprays each nostril.  Pseudoephedrine on as-needed basis  - Seems to be controlled at this time    Hyperlipidemia  - Borderline elevated with   - Will continue optimizing nutrition, maintaining healthy weight  - We will check repeat cholesterol level in about 3 months    Umbilicus cellulitis  - Status post antibiotics, seems to have resolved    Skin lesions  - 3 scalp cysts, nontender on examination without overlying erythema.  He is most concerned about the frontal scalp cyst.  Noted family history of this  - 2 benign appearing nevi, right temporal and left parietal area  - Referral for Dr. Dc of dermatology         Orders This Visit:  No orders of the defined types were placed in this encounter.      Meds This Visit:  Requested Prescriptions      No prescriptions requested or ordered in this encounter       Imaging & Referrals:  None       Health Maintenance  Due for COVID dose #4    Spent 30 minutes obtaining history, evaluating patient, discussing treatment options, diet, exercise, review of available labs and radiology reports, and completing documentation.     Kristopher Dumont MD, 07/15/24, 2:52 PM

## 2024-07-15 ENCOUNTER — OFFICE VISIT (OUTPATIENT)
Dept: INTERNAL MEDICINE CLINIC | Facility: CLINIC | Age: 29
End: 2024-07-15

## 2024-07-15 VITALS
HEIGHT: 73 IN | OXYGEN SATURATION: 98 % | WEIGHT: 212.5 LBS | BODY MASS INDEX: 28.16 KG/M2 | DIASTOLIC BLOOD PRESSURE: 84 MMHG | HEART RATE: 90 BPM | SYSTOLIC BLOOD PRESSURE: 132 MMHG

## 2024-07-15 DIAGNOSIS — E78.5 BORDERLINE HYPERLIPIDEMIA: ICD-10-CM

## 2024-07-15 DIAGNOSIS — J32.9 CHRONIC RHINOSINUSITIS: Primary | ICD-10-CM

## 2024-07-15 DIAGNOSIS — L98.9 SKIN LESIONS: ICD-10-CM

## 2024-07-15 PROCEDURE — 99214 OFFICE O/P EST MOD 30 MIN: CPT | Performed by: INTERNAL MEDICINE

## (undated) NOTE — LETTER
7/3/2023          To Whom It May Concern:    Micheal Pozo is currently under my medical care and may not return to work at this time. Please excuse Adrianageovanni Katie on 7/3/23. He may return to work on 7/5/23. Activity is restricted as follows: none. If you require additional information please contact our office.       Sincerely,        Chris Hrenandez MD          Document generated by:  Elaine Villela RN